# Patient Record
Sex: MALE | Race: WHITE | NOT HISPANIC OR LATINO | Employment: OTHER | URBAN - METROPOLITAN AREA
[De-identification: names, ages, dates, MRNs, and addresses within clinical notes are randomized per-mention and may not be internally consistent; named-entity substitution may affect disease eponyms.]

---

## 2021-01-05 ENCOUNTER — HISTORICAL (OUTPATIENT)
Dept: LAB | Facility: HOSPITAL | Age: 81
End: 2021-01-05

## 2021-01-05 LAB
APPEARANCE, UA: ABNORMAL
BACTERIA SPEC CULT: ABNORMAL
BILIRUB UR QL STRIP: NEGATIVE
COLOR UR: YELLOW
GLUCOSE (UA): ABNORMAL
HGB UR QL STRIP: ABNORMAL
KETONES UR QL STRIP: ABNORMAL
LEUKOCYTE ESTERASE UR QL STRIP: ABNORMAL
NITRITE UR QL STRIP: NEGATIVE
PH UR STRIP: 5.5 [PH] (ref 4.6–8)
PROT UR QL STRIP: ABNORMAL
RBC #/AREA URNS HPF: ABNORMAL /[HPF]
SP GR UR STRIP: 1.02 (ref 1–1.03)
SQUAMOUS EPITHELIAL, UA: ABNORMAL
UROBILINOGEN UR STRIP-ACNC: 0.2
WBC #/AREA URNS HPF: ABNORMAL /HPF

## 2021-12-08 ENCOUNTER — HISTORICAL (OUTPATIENT)
Dept: LAB | Facility: HOSPITAL | Age: 81
End: 2021-12-08

## 2021-12-08 LAB — PSA SERPL-MCNC: 6.04 NG/ML

## 2022-04-11 ENCOUNTER — HISTORICAL (OUTPATIENT)
Dept: ADMINISTRATIVE | Facility: HOSPITAL | Age: 82
End: 2022-04-11

## 2022-04-27 VITALS
HEIGHT: 66 IN | SYSTOLIC BLOOD PRESSURE: 98 MMHG | BODY MASS INDEX: 34.37 KG/M2 | DIASTOLIC BLOOD PRESSURE: 54 MMHG | WEIGHT: 213.88 LBS

## 2022-06-07 ENCOUNTER — LAB VISIT (OUTPATIENT)
Dept: LAB | Facility: HOSPITAL | Age: 82
End: 2022-06-07
Attending: UROLOGY
Payer: MEDICARE

## 2022-06-07 DIAGNOSIS — R97.20 ELEVATED PROSTATE SPECIFIC ANTIGEN (PSA): Primary | ICD-10-CM

## 2022-06-07 LAB — PSA SERPL-MCNC: 5.67 NG/ML

## 2022-06-07 PROCEDURE — 36415 COLL VENOUS BLD VENIPUNCTURE: CPT

## 2022-06-07 PROCEDURE — 84153 ASSAY OF PSA TOTAL: CPT

## 2022-09-13 RX ORDER — LISINOPRIL 40 MG/1
40 TABLET ORAL
COMMUNITY

## 2022-09-13 RX ORDER — ATORVASTATIN CALCIUM 40 MG/1
40 TABLET, FILM COATED ORAL
COMMUNITY

## 2022-09-13 RX ORDER — LORATADINE 10 MG/1
10 TABLET ORAL
COMMUNITY

## 2022-09-13 RX ORDER — METFORMIN HYDROCHLORIDE 1000 MG/1
1000 TABLET ORAL 2 TIMES DAILY WITH MEALS
COMMUNITY

## 2022-09-13 RX ORDER — ACARBOSE 100 MG/1
100 TABLET ORAL 2 TIMES DAILY
COMMUNITY

## 2022-09-13 RX ORDER — TAMSULOSIN HYDROCHLORIDE 0.4 MG/1
1 CAPSULE ORAL DAILY
COMMUNITY

## 2022-09-13 RX ORDER — CHLORTHALIDONE 25 MG/1
25 TABLET ORAL
COMMUNITY

## 2022-09-13 RX ORDER — DONEPEZIL HYDROCHLORIDE 5 MG/1
5 TABLET, FILM COATED ORAL NIGHTLY
COMMUNITY
Start: 2022-05-10

## 2022-10-11 ENCOUNTER — TELEPHONE (OUTPATIENT)
Dept: NEUROLOGY | Facility: CLINIC | Age: 82
End: 2022-10-11
Payer: MEDICARE

## 2022-10-12 ENCOUNTER — OFFICE VISIT (OUTPATIENT)
Dept: NEUROLOGY | Facility: CLINIC | Age: 82
End: 2022-10-12
Payer: MEDICARE

## 2022-10-12 VITALS
HEIGHT: 66 IN | WEIGHT: 183 LBS | DIASTOLIC BLOOD PRESSURE: 60 MMHG | SYSTOLIC BLOOD PRESSURE: 130 MMHG | BODY MASS INDEX: 29.41 KG/M2

## 2022-10-12 DIAGNOSIS — G31.83 LEWY BODY DEMENTIA WITH BEHAVIORAL DISTURBANCE: Primary | ICD-10-CM

## 2022-10-12 DIAGNOSIS — G20.A1 PARKINSON'S DISEASE: ICD-10-CM

## 2022-10-12 DIAGNOSIS — R44.3 HALLUCINATIONS: ICD-10-CM

## 2022-10-12 DIAGNOSIS — F02.818 LEWY BODY DEMENTIA WITH BEHAVIORAL DISTURBANCE: Primary | ICD-10-CM

## 2022-10-12 PROCEDURE — 99213 OFFICE O/P EST LOW 20 MIN: CPT | Mod: PBBFAC | Performed by: SPECIALIST

## 2022-10-12 PROCEDURE — 99999 PR PBB SHADOW E&M-EST. PATIENT-LVL III: CPT | Mod: PBBFAC,,, | Performed by: SPECIALIST

## 2022-10-12 PROCEDURE — 99215 PR OFFICE/OUTPT VISIT, EST, LEVL V, 40-54 MIN: ICD-10-PCS | Mod: S$PBB,,, | Performed by: SPECIALIST

## 2022-10-12 PROCEDURE — 99215 OFFICE O/P EST HI 40 MIN: CPT | Mod: S$PBB,,, | Performed by: SPECIALIST

## 2022-10-12 PROCEDURE — 99999 PR PBB SHADOW E&M-EST. PATIENT-LVL III: ICD-10-PCS | Mod: PBBFAC,,, | Performed by: SPECIALIST

## 2022-10-12 RX ORDER — INSULIN ASPART 100 [IU]/ML
INJECTION, SOLUTION INTRAVENOUS; SUBCUTANEOUS
COMMUNITY

## 2022-10-12 RX ORDER — QUETIAPINE FUMARATE 50 MG/1
50 TABLET, FILM COATED ORAL NIGHTLY
Qty: 90 TABLET | Refills: 3 | Status: SHIPPED | OUTPATIENT
Start: 2022-10-12 | End: 2023-10-12

## 2022-10-12 NOTE — PROGRESS NOTES
Subjective:         Patient ID: Randy Potts is a 82 y.o. male.    Chief Complaint: PD fu     HPI:           Parkinson's disease (Pt having inc in hand tremor, diff w motor skills; balance and gait diff: falls, shuffling/../Reports having hallucinations/../Diff w sleep maintenance; having restless sleep/../Reports inc drooling)  Wife Vickie, here  w pt as was his dtr Gauri.  Ms Vickie reports he soiled a stool in the restroom recently and that he sometimes doesn't sleep all night with restlessness and hallucinations.    He will sometimes dress himself only to realize he hasn't placed a depends undergarment then has to get dressed again.     notes may also be on facesheet for HPI, ROS, and other sections     Review of Systems            Social History     Socioeconomic History    Marital status:    Tobacco Use    Smoking status: Former     Types: Cigarettes    Smokeless tobacco: Never   Substance and Sexual Activity    Alcohol use: Not Currently    Drug use: Never         Current Outpatient Medications:     acarbose (PRECOSE) 100 MG Tab, Take 100 mg by mouth 2 (two) times a day., Disp: , Rfl:     atorvastatin (LIPITOR) 40 MG tablet, Take 40 mg by mouth., Disp: , Rfl:     donepeziL (ARICEPT) 5 MG tablet, Take 5 mg by mouth every evening., Disp: , Rfl:     insulin aspart U-100 (NOVOLOG) 100 unit/mL (3 mL) InPn pen, Inject into the skin., Disp: , Rfl:     insulin regular 100 unit/mL Inj injection, Inject into the skin., Disp: , Rfl:     lisinopriL (PRINIVIL,ZESTRIL) 40 MG tablet, Take 40 mg by mouth., Disp: , Rfl:     metFORMIN (GLUCOPHAGE) 1000 MG tablet, Take 1,000 mg by mouth 2 (two) times daily with meals., Disp: , Rfl:     tamsulosin (FLOMAX) 0.4 mg Cap, Take 1 capsule by mouth once daily., Disp: , Rfl:     chlorthalidone (HYGROTEN) 25 MG Tab, Take 25 mg by mouth., Disp: , Rfl:     loratadine (CLARITIN) 10 mg tablet, Take 10 mg by mouth., Disp: , Rfl:     QUEtiapine (SEROQUEL) 50 MG tablet, Take 1 tablet  "(50 mg total) by mouth every evening., Disp: 90 tablet, Rfl: 3   Sounds like he's off the diuretic     Objective:      Exam  /60   Ht 5' 6" (1.676 m)   Wt 83 kg (182 lb 15.7 oz)   BMI 29.53 kg/m²     General:   if accompanied, by:_ wife Vickie and dtr Gauri     heart: RRR  pharynx:    Neurological  Speech: ok   vis fields: VF ok   EOMs: ok   funduscopic:  Motor: moves all 4 extr's   coord:  F to N ok   Gait: unaided   Rest tremor RUE     Neuroimaging:  Images and imaging reports reviewed.  My comments:     Labs:    meds:      __._ multiple issues/ diagnoses or problems [if not enumerated in note then discussed in encounter but not documented]    complexity of data     _._high _mod   __ images and reports reviewed:  _._ hx obtained from family or accompaniment:   __other studies reviewed   __studies ordered __   __studies considered or discussed but not ordered __  __DDx discussed __    Risks    _._high _mod   _._ (possible or definite) neurodegenerative condition and inherent progression  __ (poss or def) autoimmune condition with possibility of flares or unexpected attack  __ (poss or def) seiz d.o. with possib of recurr seiz's   __ cerebrovasc ds with risk of recurrence of stroke  _._ CNS meds (and/or) potentially high risk non CNS meds which may cause medical or behavioral side effects  _._ fall risk  _._ driving discussed  no longer drives   __ diagnosis unclear or DDx wide making risk uncertain to high  __other:    MDM/Medical Decision Making     _._high  _moderate           Assessment/Plan:       Problem List Items Addressed This Visit          Neuro    Lewy body dementia with behavioral disturbance - Primary    Parkinson's disease       Other    Hallucinations       Other comments/ follow up:        Imaging orders (if any):   No orders of the defined types were placed in this encounter.     Medications Ordered This Encounter   Medications    QUEtiapine (SEROQUEL) 50 MG tablet     Sig: Take 1 tablet " (50 mg total) by mouth every evening.     Dispense:  90 tablet     Refill:  3     Discussed w Ms Johnston and with Ms Astorga each independently outside of the patient clinic room then amongst all three that it is my opinion that he needs to be in facility for dementia care    MD RAEANN StreetA

## 2022-11-08 ENCOUNTER — TELEPHONE (OUTPATIENT)
Dept: NEUROLOGY | Facility: CLINIC | Age: 82
End: 2022-11-08
Payer: MEDICARE

## 2022-11-08 NOTE — TELEPHONE ENCOUNTER
Dominique Gaston (Step Daughter) states that at last visit, pt was prescribed Seroquel to sleep and [facility] However, [facility].     Dominique states that patient is doing well and no longer in need of medication. Pt is going into nursing home where they are obligated to give pt medication listed in paperwork sent to them. Seroquel is in pateint's list of medication for nursing home to give.     Asking if medication be d/c'd and nursing home be notified in order to not have any issues during admission when pt does not have this medication. Dominique asking this be done, only if Dr. Rizvi agrees with discontinuing.     Phone: [phone]

## 2022-11-09 NOTE — TELEPHONE ENCOUNTER
Dominique notified; requested documentation be faxed to facility; faxed dialogue to [facility] regarding med d/c